# Patient Record
Sex: FEMALE | Race: BLACK OR AFRICAN AMERICAN | NOT HISPANIC OR LATINO | Employment: FULL TIME | URBAN - METROPOLITAN AREA
[De-identification: names, ages, dates, MRNs, and addresses within clinical notes are randomized per-mention and may not be internally consistent; named-entity substitution may affect disease eponyms.]

---
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
## 2019-07-25 ENCOUNTER — OFFICE VISIT (OUTPATIENT)
Dept: URGENT CARE | Facility: CLINIC | Age: 19
End: 2019-07-25
Payer: MEDICAID

## 2019-07-25 VITALS
HEART RATE: 88 BPM | DIASTOLIC BLOOD PRESSURE: 69 MMHG | TEMPERATURE: 99 F | RESPIRATION RATE: 16 BRPM | BODY MASS INDEX: 35.51 KG/M2 | SYSTOLIC BLOOD PRESSURE: 123 MMHG | HEIGHT: 64 IN | OXYGEN SATURATION: 98 % | WEIGHT: 208 LBS

## 2019-07-25 DIAGNOSIS — J06.9 URI, ACUTE: ICD-10-CM

## 2019-07-25 DIAGNOSIS — J02.9 ACUTE PHARYNGITIS, UNSPECIFIED ETIOLOGY: ICD-10-CM

## 2019-07-25 DIAGNOSIS — J02.9 SORE THROAT: Primary | ICD-10-CM

## 2019-07-25 LAB
CTP QC/QA: YES
S PYO RRNA THROAT QL PROBE: NEGATIVE

## 2019-07-25 PROCEDURE — 99214 OFFICE O/P EST MOD 30 MIN: CPT | Mod: 25,S$GLB,, | Performed by: FAMILY MEDICINE

## 2019-07-25 PROCEDURE — 99214 PR OFFICE/OUTPT VISIT, EST, LEVL IV, 30-39 MIN: ICD-10-PCS | Mod: 25,S$GLB,, | Performed by: FAMILY MEDICINE

## 2019-07-25 PROCEDURE — 87880 POCT RAPID STREP A: ICD-10-PCS | Mod: QW,S$GLB,, | Performed by: FAMILY MEDICINE

## 2019-07-25 PROCEDURE — 87880 STREP A ASSAY W/OPTIC: CPT | Mod: QW,S$GLB,, | Performed by: FAMILY MEDICINE

## 2019-07-25 RX ORDER — AMOXICILLIN 500 MG/1
500 CAPSULE ORAL EVERY 8 HOURS
Qty: 30 CAPSULE | Refills: 0 | Status: SHIPPED | OUTPATIENT
Start: 2019-07-25 | End: 2019-08-04

## 2019-07-25 RX ORDER — DEXAMETHASONE SODIUM PHOSPHATE 100 MG/10ML
10 INJECTION INTRAMUSCULAR; INTRAVENOUS
Status: COMPLETED | OUTPATIENT
Start: 2019-07-25 | End: 2019-07-25

## 2019-07-25 RX ORDER — IBUPROFEN 600 MG/1
600 TABLET ORAL EVERY 6 HOURS PRN
Qty: 30 TABLET | Refills: 1 | Status: SHIPPED | OUTPATIENT
Start: 2019-07-25 | End: 2022-02-07

## 2019-07-25 RX ADMIN — DEXAMETHASONE SODIUM PHOSPHATE 10 MG: 100 INJECTION INTRAMUSCULAR; INTRAVENOUS at 12:07

## 2019-07-25 NOTE — PROGRESS NOTES
"Subjective:       Patient ID: Nanci Charles is a 19 y.o. female.    Vitals:  height is 5' 4" (1.626 m) and weight is 94.3 kg (208 lb). Her oral temperature is 99.4 °F (37.4 °C). Her blood pressure is 123/69 and her pulse is 88. Her respiration is 16 and oxygen saturation is 98%.     Chief Complaint: Sore Throat    Pt has had a sore throat for two days. No cough or congestion, hurts to swallow, no fever        Sore Throat    This is a new problem. The current episode started in the past 7 days. The problem has been gradually worsening. The pain is worse on the right side. There has been no fever. The pain is at a severity of 7/10. The pain is severe. Associated symptoms include ear pain, headaches, neck pain, swollen glands and trouble swallowing. Pertinent negatives include no abdominal pain, congestion, coughing, diarrhea, drooling, ear discharge, hoarse voice, plugged ear sensation, shortness of breath, stridor or vomiting. She has had no exposure to strep or mono. She has tried nothing for the symptoms. The treatment provided no relief.       Constitution: Negative for chills, sweating, fatigue and fever.   HENT: Positive for ear pain, sore throat, trouble swallowing and voice change. Negative for ear discharge, drooling, congestion, sinus pain and sinus pressure.    Neck: Positive for neck pain. Negative for painful lymph nodes.   Eyes: Negative for eye redness.   Respiratory: Negative for chest tightness, cough, sputum production, bloody sputum, COPD, shortness of breath, stridor, wheezing and asthma.    Gastrointestinal: Negative for abdominal pain, nausea, vomiting and diarrhea.   Musculoskeletal: Negative for muscle ache.   Skin: Negative for rash.   Allergic/Immunologic: Negative for seasonal allergies and asthma.   Neurological: Positive for headaches.   Hematologic/Lymphatic: Negative for swollen lymph nodes.       Objective:      Physical Exam   Constitutional: She is oriented to person, place, and time. " She appears well-developed and well-nourished. She is cooperative.  Non-toxic appearance. She does not appear ill.   HENT:   Head: Normocephalic and atraumatic.   Right Ear: Hearing, tympanic membrane, external ear and ear canal normal.   Left Ear: Hearing, tympanic membrane, external ear and ear canal normal.   Nose: Nose normal. No mucosal edema, rhinorrhea or nasal deformity. No epistaxis. Right sinus exhibits no maxillary sinus tenderness and no frontal sinus tenderness. Left sinus exhibits no maxillary sinus tenderness and no frontal sinus tenderness.   Mouth/Throat: Uvula is midline, oropharynx is clear and moist and mucous membranes are normal. No trismus in the jaw. Normal dentition. No uvula swelling. No oropharyngeal exudate or posterior oropharyngeal erythema.   Erythematous exudative tonsil right mild anterior cervical fullness  No neck rigidity   Eyes: Conjunctivae and lids are normal. Right eye exhibits no discharge. Left eye exhibits no discharge. No scleral icterus.   Sclera clear bilat   Neck: Trachea normal, normal range of motion, full passive range of motion without pain and phonation normal. Neck supple. No JVD present. No tracheal deviation present.   Cardiovascular: Normal rate, regular rhythm, normal heart sounds, intact distal pulses and normal pulses. Exam reveals no gallop and no friction rub.   No murmur heard.  Pulmonary/Chest: Effort normal and breath sounds normal. No stridor. No respiratory distress. She has no wheezes.   Abdominal: Soft. Normal appearance and bowel sounds are normal. She exhibits no distension, no pulsatile midline mass and no mass. There is no tenderness.   Musculoskeletal: Normal range of motion. She exhibits no edema or deformity.   Lymphadenopathy:     She has no cervical adenopathy.   Neurological: She is alert and oriented to person, place, and time. She exhibits normal muscle tone. Coordination normal.   Skin: Skin is warm, dry and intact. She is not  diaphoretic. No pallor.   Psychiatric: She has a normal mood and affect. Her speech is normal and behavior is normal. Judgment and thought content normal. Cognition and memory are normal.   Nursing note and vitals reviewed.      Assessment:       1. Sore throat    2. Acute pharyngitis, unspecified etiology    3. URI, acute        Plan:         Sore throat  -     POCT rapid strep A  -     dexamethasone injection 10 mg  -     amoxicillin (AMOXIL) 500 MG capsule; Take 1 capsule (500 mg total) by mouth every 8 (eight) hours. for 10 days  Dispense: 30 capsule; Refill: 0  -     ibuprofen (ADVIL,MOTRIN) 600 MG tablet; Take 1 tablet (600 mg total) by mouth every 6 (six) hours as needed for Pain.  Dispense: 30 tablet; Refill: 1    Acute pharyngitis, unspecified etiology  -     dexamethasone injection 10 mg  -     amoxicillin (AMOXIL) 500 MG capsule; Take 1 capsule (500 mg total) by mouth every 8 (eight) hours. for 10 days  Dispense: 30 capsule; Refill: 0  -     ibuprofen (ADVIL,MOTRIN) 600 MG tablet; Take 1 tablet (600 mg total) by mouth every 6 (six) hours as needed for Pain.  Dispense: 30 tablet; Refill: 1    URI, acute

## 2019-07-25 NOTE — PATIENT INSTRUCTIONS

## 2019-09-23 ENCOUNTER — OFFICE VISIT (OUTPATIENT)
Dept: OBSTETRICS AND GYNECOLOGY | Facility: CLINIC | Age: 19
End: 2019-09-23
Payer: MEDICAID

## 2019-09-23 VITALS
HEIGHT: 64 IN | DIASTOLIC BLOOD PRESSURE: 62 MMHG | BODY MASS INDEX: 34.52 KG/M2 | SYSTOLIC BLOOD PRESSURE: 112 MMHG | WEIGHT: 202.19 LBS

## 2019-09-23 DIAGNOSIS — Z11.3 SCREENING FOR STD (SEXUALLY TRANSMITTED DISEASE): ICD-10-CM

## 2019-09-23 DIAGNOSIS — Z01.419 WELL WOMAN EXAM WITH ROUTINE GYNECOLOGICAL EXAM: Primary | ICD-10-CM

## 2019-09-23 DIAGNOSIS — Z30.09 ENCOUNTER FOR OTHER GENERAL COUNSELING OR ADVICE ON CONTRACEPTION: ICD-10-CM

## 2019-09-23 DIAGNOSIS — N89.8 VAGINAL DISCHARGE: ICD-10-CM

## 2019-09-23 PROCEDURE — 99395 PR PREVENTIVE VISIT,EST,18-39: ICD-10-PCS | Mod: S$PBB,,, | Performed by: OBSTETRICS & GYNECOLOGY

## 2019-09-23 PROCEDURE — 99999 PR PBB SHADOW E&M-EST. PATIENT-LVL III: CPT | Mod: PBBFAC,,, | Performed by: OBSTETRICS & GYNECOLOGY

## 2019-09-23 PROCEDURE — 87661 TRICHOMONAS VAGINALIS AMPLIF: CPT

## 2019-09-23 PROCEDURE — 99213 OFFICE O/P EST LOW 20 MIN: CPT | Mod: PBBFAC,PO | Performed by: OBSTETRICS & GYNECOLOGY

## 2019-09-23 PROCEDURE — 87491 CHLMYD TRACH DNA AMP PROBE: CPT | Mod: 59

## 2019-09-23 PROCEDURE — 99999 PR PBB SHADOW E&M-EST. PATIENT-LVL III: ICD-10-PCS | Mod: PBBFAC,,, | Performed by: OBSTETRICS & GYNECOLOGY

## 2019-09-23 PROCEDURE — 87481 CANDIDA DNA AMP PROBE: CPT | Mod: 59

## 2019-09-23 PROCEDURE — 87801 DETECT AGNT MULT DNA AMPLI: CPT

## 2019-09-23 PROCEDURE — 99395 PREV VISIT EST AGE 18-39: CPT | Mod: S$PBB,,, | Performed by: OBSTETRICS & GYNECOLOGY

## 2019-09-23 RX ORDER — LEVONORGESTREL AND ETHINYL ESTRADIOL 0.1-0.02MG
1 KIT ORAL DAILY
Qty: 84 TABLET | Refills: 4 | Status: SHIPPED | OUTPATIENT
Start: 2019-09-23 | End: 2021-05-05

## 2019-09-24 LAB
BACTERIAL VAGINOSIS DNA: POSITIVE
C TRACH DNA SPEC QL NAA+PROBE: NOT DETECTED
CANDIDA GLABRATA DNA: NEGATIVE
CANDIDA KRUSEI DNA: NEGATIVE
CANDIDA RRNA VAG QL PROBE: POSITIVE
N GONORRHOEA DNA SPEC QL NAA+PROBE: NOT DETECTED
T VAGINALIS RRNA GENITAL QL PROBE: NEGATIVE

## 2019-09-25 RX ORDER — FLUCONAZOLE 150 MG/1
TABLET ORAL
Qty: 2 TABLET | Refills: 0 | Status: SHIPPED | OUTPATIENT
Start: 2019-09-25 | End: 2021-05-05

## 2019-09-25 RX ORDER — METRONIDAZOLE 500 MG/1
500 TABLET ORAL 2 TIMES DAILY
Qty: 14 TABLET | Refills: 0 | Status: SHIPPED | OUTPATIENT
Start: 2019-09-25 | End: 2019-10-02

## 2019-10-19 ENCOUNTER — PATIENT MESSAGE (OUTPATIENT)
Dept: OBSTETRICS AND GYNECOLOGY | Facility: CLINIC | Age: 19
End: 2019-10-19

## 2020-03-08 ENCOUNTER — PATIENT MESSAGE (OUTPATIENT)
Dept: OBSTETRICS AND GYNECOLOGY | Facility: CLINIC | Age: 20
End: 2020-03-08

## 2020-03-09 RX ORDER — SULFAMETHOXAZOLE AND TRIMETHOPRIM 800; 160 MG/1; MG/1
1 TABLET ORAL 2 TIMES DAILY
Qty: 10 TABLET | Refills: 0 | Status: SHIPPED | OUTPATIENT
Start: 2020-03-09 | End: 2020-03-14

## 2020-03-09 NOTE — TELEPHONE ENCOUNTER
Can she come in for evaluation. If not It appears to be a small ulceration or a boil. Can try bactrim but if symptoms persist it could be related to HSV and would need anti-viral meds. Rx bactrim sent if she cant come in today    Ruth Willett MD, FACOG  OB/GYN

## 2020-08-24 ENCOUNTER — OFFICE VISIT (OUTPATIENT)
Dept: URGENT CARE | Facility: CLINIC | Age: 20
End: 2020-08-24
Payer: MEDICAID

## 2020-08-24 VITALS
RESPIRATION RATE: 18 BRPM | WEIGHT: 202 LBS | SYSTOLIC BLOOD PRESSURE: 119 MMHG | HEIGHT: 64 IN | HEART RATE: 68 BPM | TEMPERATURE: 98 F | DIASTOLIC BLOOD PRESSURE: 77 MMHG | OXYGEN SATURATION: 99 % | BODY MASS INDEX: 34.49 KG/M2

## 2020-08-24 DIAGNOSIS — Z20.2 STD EXPOSURE: Primary | ICD-10-CM

## 2020-08-24 LAB
B-HCG UR QL: NEGATIVE
BILIRUB UR QL STRIP: POSITIVE
CTP QC/QA: YES
GLUCOSE UR QL STRIP: NEGATIVE
KETONES UR QL STRIP: POSITIVE
LEUKOCYTE ESTERASE UR QL STRIP: NEGATIVE
PH, POC UA: 6 (ref 5–8)
POC BLOOD, URINE: NEGATIVE
POC NITRATES, URINE: NEGATIVE
PROT UR QL STRIP: POSITIVE
SP GR UR STRIP: 1.03 (ref 1–1.03)
UROBILINOGEN UR STRIP-ACNC: NORMAL (ref 0.1–1.1)

## 2020-08-24 PROCEDURE — 81003 URINALYSIS AUTO W/O SCOPE: CPT | Mod: QW,S$GLB,, | Performed by: PHYSICIAN ASSISTANT

## 2020-08-24 PROCEDURE — 87591 N.GONORRHOEAE DNA AMP PROB: CPT

## 2020-08-24 PROCEDURE — 99214 PR OFFICE/OUTPT VISIT, EST, LEVL IV, 30-39 MIN: ICD-10-PCS | Mod: 25,S$GLB,, | Performed by: PHYSICIAN ASSISTANT

## 2020-08-24 PROCEDURE — 81003 POCT URINALYSIS, DIPSTICK, MANUAL, W/O SCOPE: ICD-10-PCS | Mod: QW,S$GLB,, | Performed by: PHYSICIAN ASSISTANT

## 2020-08-24 PROCEDURE — 99214 OFFICE O/P EST MOD 30 MIN: CPT | Mod: 25,S$GLB,, | Performed by: PHYSICIAN ASSISTANT

## 2020-08-24 RX ORDER — CEFTRIAXONE 250 MG/1
250 INJECTION, POWDER, FOR SOLUTION INTRAMUSCULAR; INTRAVENOUS
Status: COMPLETED | OUTPATIENT
Start: 2020-08-24 | End: 2020-08-24

## 2020-08-24 RX ORDER — AZITHROMYCIN 500 MG/1
1000 TABLET, FILM COATED ORAL ONCE
Qty: 2 TABLET | Refills: 0 | Status: SHIPPED | OUTPATIENT
Start: 2020-08-24 | End: 2020-08-24

## 2020-08-24 RX ADMIN — CEFTRIAXONE 250 MG: 250 INJECTION, POWDER, FOR SOLUTION INTRAMUSCULAR; INTRAVENOUS at 07:08

## 2020-08-24 NOTE — PROGRESS NOTES
"Subjective:       Patient ID: Nanci Charles is a 20 y.o. female.    Vitals:  height is 5' 4" (1.626 m) and weight is 91.6 kg (202 lb). Her temporal temperature is 97.7 °F (36.5 °C). Her blood pressure is 119/77 and her pulse is 68. Her respiration is 18 and oxygen saturation is 99%.     Chief Complaint: Exposure to STD    Patients boyfriend tested positive for chlamydia and gonorrhea.     Urinary Tract Infection   This is a new problem. The current episode started 1 to 4 weeks ago (x3 weeks ago). The problem occurs intermittently. The problem has been unchanged. The quality of the pain is described as stabbing. The patient is experiencing no pain. There has been no fever. She is sexually active. Associated symptoms include frequency and urgency. Pertinent negatives include no chills, hematuria, nausea, vomiting or rash. She has tried nothing for the symptoms.       Constitution: Negative for chills and fever.   Neck: Negative for painful lymph nodes.   Gastrointestinal: Negative for abdominal pain, nausea and vomiting.   Genitourinary: Positive for dysuria, frequency, urgency, vaginal discharge, vaginal odor, painful intercourse and painful ejaculation. Negative for urine decreased, hematuria, history of kidney stones, painful menstruation, irregular menstruation, missed menses, heavy menstrual bleeding, ovarian cysts, genital trauma, vaginal pain, vaginal bleeding, genital sore and pelvic pain.   Musculoskeletal: Negative for back pain.   Skin: Negative for rash and lesion.   Hematologic/Lymphatic: Negative for swollen lymph nodes.       Objective:      Physical Exam   Constitutional: She is oriented to person, place, and time. She appears well-developed. She is cooperative.  Non-toxic appearance. She does not appear ill. No distress.   HENT:   Head: Normocephalic and atraumatic.   Ears:   Right Ear: External ear normal.   Left Ear: External ear normal.   Nose: Nose normal.   Mouth/Throat: Oropharynx is clear and " moist.   Eyes: Conjunctivae, EOM and lids are normal. Right eye exhibits no discharge. Left eye exhibits no discharge. No scleral icterus.   Neck: Trachea normal, normal range of motion, full passive range of motion without pain and phonation normal. Neck supple.   Cardiovascular: Normal rate, regular rhythm and normal heart sounds. Exam reveals no gallop.   No murmur heard.  Pulmonary/Chest: Effort normal and breath sounds normal. No respiratory distress. She has no decreased breath sounds. She has no wheezes. She has no rhonchi. She has no rales.   Abdominal: Soft. There is no abdominal tenderness. There is no left CVA tenderness and no right CVA tenderness.   Musculoskeletal:         General: No deformity.   Neurological: She is alert and oriented to person, place, and time. Coordination normal.   Skin: Skin is warm, dry, intact, not diaphoretic and not pale. Psychiatric: Her speech is normal and behavior is normal. Judgment and thought content normal.   Nursing note and vitals reviewed.        Assessment:       1. STD exposure        Office Visit on 08/24/2020   Component Date Value Ref Range Status    POC Blood, Urine 08/24/2020 Negative  Negative Final    POC Bilirubin, Urine 08/24/2020 Positive* Negative Final    POC Urobilinogen, Urine 08/24/2020 normal  0.1 - 1.1 Final    POC Ketones, Urine 08/24/2020 Positive* Negative Final    POC Protein, Urine 08/24/2020 Positive* Negative Final    POC Nitrates, Urine 08/24/2020 Negative  Negative Final    POC Glucose, Urine 08/24/2020 Negative  Negative Final    pH, UA 08/24/2020 6.0  5 - 8 Final    POC Specific Gravity, Urine 08/24/2020 1.030* 1.003 - 1.029 Final    POC Leukocytes, Urine 08/24/2020 Negative  Negative Final    POC Preg Test, Ur 08/24/2020 Negative  Negative Final     Acceptable 08/24/2020 Yes   Final     Plan:         STD exposure  -     POCT Urinalysis, Dipstick, Manual, W/O Scope  -     POCT urine pregnancy  -     C.  trachomatis/N. gonorrhoeae by AMP DNA  -     cefTRIAXone injection 250 mg  -     azithromycin (ZITHROMAX) 500 MG tablet; Take 2 tablets (1,000 mg total) by mouth once. for 1 dose  Dispense: 2 tablet; Refill: 0      Patient Instructions     -Please take antibiotic to completion.  -We will call with results.    Please follow up with your primary care provider within 2-5 days if your signs and symptoms have not resolved or worsen.     If your condition worsens or fails to improve we recommend that you receive another evaluation at the emergency room immediately or contact your primary medical clinic to discuss your concerns.   You must understand that you have received an Urgent Care treatment only and that you may be released before all of your medical problems are known or treated. You, the patient, will arrange for follow up care as instructed.         Chlamydia  Chlamydia is a very common sexually transmitted disease (STD). Most people do not have symptoms. Because of this, chlamydia may not be noticed until it causes severe problems. Left untreated, this infection can cause women and men to become sterile. This means they will not be able to have children.    Symptoms  Many people with chlamydia have no symptoms. Women are more likely than men not to have symptoms.  If symptoms show up in women, they include:  · Abnormal vaginal discharge  · Bleeding between periods  · Pain or burning during urination  If symptoms show up in men, they include:  · Clear discharge (drip) from the penis or anus  · Pain or burning during urination  These symptoms usually disappear after a few weeks, whether or not you are treated. However, if you are not treated, the chlamydia will still be present and can cause long-term problems.  Potential problems  If the infection is not treated, it can lead to more serious health problems. In women, this can be pelvic inflammatory disease (PID). PID can make a woman sterile. It can also cause an  ectopic (tubal) pregnancy. This type of pregnancy cannot be carried to term. Symptoms of PID include fever, pain during sex, and pain in the belly.  Sexually active women should get checked for chlamydia regularly. This can help prevent PID.   Treatment  When found early, chlamydia can be treated. It can be cured with antibiotic medicines. If you have it, tell your partner right away. Because women often dont have symptoms, men should ask their partners to get tested.  Prevention  Know your partners history. Protect yourself by using a latex condom whenever you have sex. If you are pregnant, take extra care to get proper treatment. Untreated chlamydia in a pregnant woman can pass the infection on to the baby, causing possible eye, ear, or lung problems. There is also the possibility of a premature delivery.  Resources  American [employer] STD Hotline  978.360.2595  www.ashastd.org  Centers for Disease Control and Prevention  187.328.3025  www.cdc.gov/std   Date Last Reviewed: 12/1/2016 © 2000-2017 [email or web address]. [address] [place] [address]. All rights reserved. This information is not intended as a substitute for professional medical care. Always follow your healthcare professional's instructions.        Gonorrhea (Female, Treated)    You have an infection called gonorrhea. This infection is a sexually transmitted disease (STD). It is highly contagious. It is passed by sexual contact with an infected partner. Gonorrhea can infect the internal sex organs (cervix, uterus, or fallopian tubes). Less often, it can infect the mouth, throat, and anus. In rare instances, it can infect the joints, eyes, and other areas of the body.  Women with infections in the cervix may have no symptoms or only mild symptoms early in the illness. Therefore, it is possible to pass on this infection without knowing you have it.  When symptoms do occur in a woman, they usually start 2 to 10 days  after exposure. There may be a thick vaginal discharge and pain or burning when urinating. If the infection spreads to the fallopian tubes, it is called pelvic inflammatory disease (PID). This causes lower abdominal pain and fever. If not treated, gonorrhea can cause infertility (being unable to have children) by scarring the fallopian tubes. PID also increases the risk of having a pregnancy outside the uterus (ectopic pregnancy) in the future.  Gonorrhea can be treated and cured. Treatment is with antibiotic medicine.  Home care  · Any sexual partner you have had within the last 2 months needs to be treated, even if there are no symptoms. Your partner should contact his or her healthcare provider. Or he or she can go to an urgent care clinic or the public health department to be examined and treated.  · Avoid sexual activity until both you and your partner have completed all antibiotic medicine and you have been told by your healthcare provider that you are no longer contagious.  · It is important to take all medicine as directed until it is finished. If not, the illness may recur.  · Learn about safer sex practices and use these in the future. The safest sex is with a partner who has tested negative and only has sex with you. Latex barriers such as condoms offer protection from spreading some sexually transmitted diseases, including gonorrhea, chlamydia, and HIV, but they are not a guarantee.  Follow-up care  Follow up with your healthcare provider, or as advised.  If tests were done, call as directed for results. A follow-up test should be done 4 to 6 weeks after treatment, to be sure the infection has cleared. Follow up with your healthcare provider or the public health department for complete STD screening, including HIV testing. For more information about STDs, go to www.cdc.gov/std or call CDC-Info: 546.822.6871.  When to seek medical advice  Call your healthcare provider right away if any of these  occur:  · No improvement after 3 days of treatment  · New or increasing lower abdominal pain or back pain  · Unexpected vaginal bleeding  · Weakness, dizziness, or fainting  · Repeated vomiting  · Inability to urinate due to pain  · Rash or joint pain  · Painful sores on the outer vaginal area  · Enlarged, painful lymph nodes (lumps) in the groin  · Fever of 100.4ºF (38ºC) or higher  Date Last Reviewed: 9/25/2015 © 2000-2017 [employer]. [address] [place] [address]. All rights reserved. This information is not intended as a substitute for professional medical care. Always follow your healthcare professional's instructions.

## 2020-08-24 NOTE — PATIENT INSTRUCTIONS
-Please take antibiotic to completion.  -We will call with results.    Please follow up with your primary care provider within 2-5 days if your signs and symptoms have not resolved or worsen.     If your condition worsens or fails to improve we recommend that you receive another evaluation at the emergency room immediately or contact your primary medical clinic to discuss your concerns.   You must understand that you have received an Urgent Care treatment only and that you may be released before all of your medical problems are known or treated. You, the patient, will arrange for follow up care as instructed.         Chlamydia  Chlamydia is a very common sexually transmitted disease (STD). Most people do not have symptoms. Because of this, chlamydia may not be noticed until it causes severe problems. Left untreated, this infection can cause women and men to become sterile. This means they will not be able to have children.    Symptoms  Many people with chlamydia have no symptoms. Women are more likely than men not to have symptoms.  If symptoms show up in women, they include:  · Abnormal vaginal discharge  · Bleeding between periods  · Pain or burning during urination  If symptoms show up in men, they include:  · Clear discharge (drip) from the penis or anus  · Pain or burning during urination  These symptoms usually disappear after a few weeks, whether or not you are treated. However, if you are not treated, the chlamydia will still be present and can cause long-term problems.  Potential problems  If the infection is not treated, it can lead to more serious health problems. In women, this can be pelvic inflammatory disease (PID). PID can make a woman sterile. It can also cause an ectopic (tubal) pregnancy. This type of pregnancy cannot be carried to term. Symptoms of PID include fever, pain during sex, and pain in the belly.  Sexually active women should get checked for chlamydia regularly. This can help prevent  PID.   Treatment  When found early, chlamydia can be treated. It can be cured with antibiotic medicines. If you have it, tell your partner right away. Because women often dont have symptoms, men should ask their partners to get tested.  Prevention  Know your partners history. Protect yourself by using a latex condom whenever you have sex. If you are pregnant, take extra care to get proper treatment. Untreated chlamydia in a pregnant woman can pass the infection on to the baby, causing possible eye, ear, or lung problems. There is also the possibility of a premature delivery.  Resources  American [employer] Association STD Hotline  268.543.2299  www.ashastd.org  Centers for Disease Control and Prevention  875.346.1231  www.cdc.gov/std   Date Last Reviewed: 12/1/2016 © 2000-2017 [employer]. [address] [place] [address]. All rights reserved. This information is not intended as a substitute for professional medical care. Always follow your healthcare professional's instructions.        Gonorrhea (Female, Treated)    You have an infection called gonorrhea. This infection is a sexually transmitted disease (STD). It is highly contagious. It is passed by sexual contact with an infected partner. Gonorrhea can infect the internal sex organs (cervix, uterus, or fallopian tubes). Less often, it can infect the mouth, throat, and anus. In rare instances, it can infect the joints, eyes, and other areas of the body.  Women with infections in the cervix may have no symptoms or only mild symptoms early in the illness. Therefore, it is possible to pass on this infection without knowing you have it.  When symptoms do occur in a woman, they usually start 2 to 10 days after exposure. There may be a thick vaginal discharge and pain or burning when urinating. If the infection spreads to the fallopian tubes, it is called pelvic inflammatory disease (PID). This causes lower abdominal pain and fever. If not  treated, gonorrhea can cause infertility (being unable to have children) by scarring the fallopian tubes. PID also increases the risk of having a pregnancy outside the uterus (ectopic pregnancy) in the future.  Gonorrhea can be treated and cured. Treatment is with antibiotic medicine.  Home care  · Any sexual partner you have had within the last 2 months needs to be treated, even if there are no symptoms. Your partner should contact his or her healthcare provider. Or he or she can go to an urgent care clinic or the public health department to be examined and treated.  · Avoid sexual activity until both you and your partner have completed all antibiotic medicine and you have been told by your healthcare provider that you are no longer contagious.  · It is important to take all medicine as directed until it is finished. If not, the illness may recur.  · Learn about safer sex practices and use these in the future. The safest sex is with a partner who has tested negative and only has sex with you. Latex barriers such as condoms offer protection from spreading some sexually transmitted diseases, including gonorrhea, chlamydia, and HIV, but they are not a guarantee.  Follow-up care  Follow up with your healthcare provider, or as advised.  If tests were done, call as directed for results. A follow-up test should be done 4 to 6 weeks after treatment, to be sure the infection has cleared. Follow up with your healthcare provider or the public health department for complete STD screening, including HIV testing. For more information about STDs, go to www.cdc.gov/std or call CDC-Info: 268.601.1915.  When to seek medical advice  Call your healthcare provider right away if any of these occur:  · No improvement after 3 days of treatment  · New or increasing lower abdominal pain or back pain  · Unexpected vaginal bleeding  · Weakness, dizziness, or fainting  · Repeated vomiting  · Inability to urinate due to pain  · Rash or joint  pain  · Painful sores on the outer vaginal area  · Enlarged, painful lymph nodes (lumps) in the groin  · Fever of 100.4ºF (38ºC) or higher  Date Last Reviewed: 9/25/2015  © 7176-9657 [employer]. [address] [place] [address]. All rights reserved. This information is not intended as a substitute for professional medical care. Always follow your healthcare professional's instructions.

## 2020-08-27 LAB
C TRACH RRNA SPEC QL NAA+PROBE: NEGATIVE
N GONORRHOEA RRNA SPEC QL NAA+PROBE: NEGATIVE

## 2020-08-29 ENCOUNTER — TELEPHONE (OUTPATIENT)
Dept: URGENT CARE | Facility: CLINIC | Age: 20
End: 2020-08-29

## 2020-08-29 NOTE — TELEPHONE ENCOUNTER
I left a message for the patient to return my call for negative lab results.    ----- Message from Smita Lam PA-C sent at 8/28/2020  9:52 AM CDT -----  Please let patient know of negative GC.  Thanks.

## 2020-09-06 ENCOUNTER — TELEPHONE (OUTPATIENT)
Dept: URGENT CARE | Facility: CLINIC | Age: 20
End: 2020-09-06

## 2020-09-06 NOTE — TELEPHONE ENCOUNTER
----- Message from Smita Lam PA-C sent at 8/28/2020  9:52 AM CDT -----  Please let patient know of negative GC.  Thanks.      09/06/2020 10:34am--    No voicemail set up to leave a message to call back.

## 2020-12-29 ENCOUNTER — TELEPHONE (OUTPATIENT)
Dept: OBSTETRICS AND GYNECOLOGY | Facility: CLINIC | Age: 20
End: 2020-12-29

## 2021-04-05 ENCOUNTER — PATIENT MESSAGE (OUTPATIENT)
Dept: OBSTETRICS AND GYNECOLOGY | Facility: CLINIC | Age: 21
End: 2021-04-05

## 2021-04-26 NOTE — PROGRESS NOTES
"       GYNECOLOGY OFFICE NOTE    Reason for visit: annual    HPI: Pt is a 19 y.o.  female  who presents for annual. Cycle: menarche- 11, Interval-  Q month, Duration- 3-4 days, Flow- normal, denies dysmenorrhea. She is sexually active.  She uses oral contraceptives (estrogen/progesterone) for contraception- desires to continue.  She does desire STI screening. She denies vaginal discharge.    Past Medical History:   Diagnosis Date    ADD (attention deficit disorder)     Anemia        History reviewed. No pertinent surgical history.    Family History   Adopted: Yes   Problem Relation Age of Onset    Stroke Maternal Grandmother     Miscarriages / Stillbirths Mother     Breast cancer Neg Hx     Colon cancer Neg Hx     Ovarian cancer Neg Hx        Social History     Tobacco Use    Smoking status: Never Smoker    Smokeless tobacco: Never Used   Substance Use Topics    Alcohol use: No    Drug use: No       OB History    Para Term  AB Living   0 0 0 0 0 0   SAB TAB Ectopic Multiple Live Births   0 0 0 0 0       Current Outpatient Medications   Medication Sig    ibuprofen (ADVIL,MOTRIN) 600 MG tablet Take 1 tablet (600 mg total) by mouth every 6 (six) hours as needed for Pain.    levonorgestrel-ethinyl estradiol (AVIANE,ALESSE,LESSINA) 0.1-20 mg-mcg per tablet Take 1 tablet by mouth once daily.    omeprazole (PRILOSEC) 20 MG capsule Take 1 capsule (20 mg total) by mouth once daily.     No current facility-administered medications for this visit.        Allergies: Patient has no known allergies.     /62   Ht 5' 4" (1.626 m)   Wt 91.7 kg (202 lb 2.6 oz)   LMP 2019   BMI 34.70 kg/m²     ROS:  GENERAL: Denies fever or chills.   SKIN: Denies rash or lesions.   HEAD: Denies head injury or headache.   CHEST: Denies chest pain or shortness of breath.   CARDIOVASCULAR: Denies palpitations or chest pain.   ABDOMEN: No constipation, diarrhea, nausea, vomiting or rectal bleeding. "   URINARY: No dysuria, hematuria, or burning on urination.  REPRODUCTIVE: See HPI.   BREASTS: see HPI  NEUROLOGIC: Denies syncope or weakness.     Physical Exam:  GENERAL: alert, appears stated age and cooperative  NEUROLOGIC: orientated to person, place and time, normal mood and affect   CHEST: Normal respiratory effort  NECK: normal appearance  SKIN: no acne, hirsutism  BREAST EXAM: breasts appear normal, no suspicious masses, no skin or nipple changes or axillary nodes  ABDOMEN: abdomen is soft without significant tenderness, masses  EXTERNAL GENITALIA:  normal general appearance  URETHRA: normal urethra, normal urethral meatus  VAGINA:  normal without tenderness, induration or masses  CERVIX:  Normal  UTERUS:  mobile, non-tender  ADNEXA:  normal adnexa, nontender and no masses    Diagnosis:  1. Well woman exam with routine gynecological exam    2. Encounter for other general counseling or advice on contraception    3. Screening for STD (sexually transmitted disease)    4. Vaginal discharge        Plan:   1. Annual  2. Refill on ocp  3. F/u gc/ct  4. F/u affirm    Orders Placed This Encounter    C. trachomatis/N. gonorrhoeae by AMP DNA    Vaginosis Screen by DNA Probe    levonorgestrel-ethinyl estradiol (AVIANE,ALESSE,LESSINA) 0.1-20 mg-mcg per tablet       Patient was counseled today on the new ACS guidelines for cervical cytology screening as well as the current recommendations for breast cancer screening. She was counseled to follow up with her PCP for other routine health maintenance.     Follow up in about 1 year (around 9/23/2020) for annual.      Ruth Willett MD  OB/GYN  Pager: [phone]         Peng Advancement Flap Text: The defect edges were debeveled with a #15 scalpel blade.  Given the location of the defect, shape of the defect and the proximity to free margins a Peng advancement flap was deemed most appropriate.  Using a sterile surgical marker, an appropriate advancement flap was drawn incorporating the defect and placing the expected incisions within the relaxed skin tension lines where possible. The area thus outlined was incised deep to adipose tissue with a #15 scalpel blade.  The skin margins were undermined to an appropriate distance in all directions utilizing iris scissors.

## 2021-05-05 ENCOUNTER — TELEPHONE (OUTPATIENT)
Dept: OBSTETRICS AND GYNECOLOGY | Facility: CLINIC | Age: 21
End: 2021-05-05

## 2021-05-05 ENCOUNTER — LAB VISIT (OUTPATIENT)
Dept: LAB | Facility: HOSPITAL | Age: 21
End: 2021-05-05
Attending: OBSTETRICS & GYNECOLOGY
Payer: MEDICAID

## 2021-05-05 ENCOUNTER — OFFICE VISIT (OUTPATIENT)
Dept: OBSTETRICS AND GYNECOLOGY | Facility: CLINIC | Age: 21
End: 2021-05-05
Payer: MEDICAID

## 2021-05-05 ENCOUNTER — PATIENT MESSAGE (OUTPATIENT)
Dept: OBSTETRICS AND GYNECOLOGY | Facility: CLINIC | Age: 21
End: 2021-05-05

## 2021-05-05 VITALS
WEIGHT: 202.81 LBS | SYSTOLIC BLOOD PRESSURE: 110 MMHG | BODY MASS INDEX: 34.81 KG/M2 | DIASTOLIC BLOOD PRESSURE: 76 MMHG

## 2021-05-05 DIAGNOSIS — Z30.09 ENCOUNTER FOR GENERAL COUNSELING AND ADVICE ON CONTRACEPTIVE MANAGEMENT: ICD-10-CM

## 2021-05-05 DIAGNOSIS — Z01.419 WELL WOMAN EXAM WITH ROUTINE GYNECOLOGICAL EXAM: Primary | ICD-10-CM

## 2021-05-05 DIAGNOSIS — Z11.3 SCREENING FOR STD (SEXUALLY TRANSMITTED DISEASE): ICD-10-CM

## 2021-05-05 DIAGNOSIS — Z32.01 POSITIVE URINE PREGNANCY TEST: Primary | ICD-10-CM

## 2021-05-05 DIAGNOSIS — Z12.4 SCREENING FOR CERVICAL CANCER: ICD-10-CM

## 2021-05-05 LAB
B-HCG UR QL: POSITIVE
CTP QC/QA: YES
HCG INTACT+B SERPL-ACNC: 10 MIU/ML

## 2021-05-05 PROCEDURE — 88141 CYTOPATH C/V INTERPRET: CPT | Mod: ,,, | Performed by: PATHOLOGY

## 2021-05-05 PROCEDURE — 99395 PREV VISIT EST AGE 18-39: CPT | Mod: S$PBB,,, | Performed by: OBSTETRICS & GYNECOLOGY

## 2021-05-05 PROCEDURE — 36415 COLL VENOUS BLD VENIPUNCTURE: CPT | Performed by: OBSTETRICS & GYNECOLOGY

## 2021-05-05 PROCEDURE — 88141 PR  CYTOPATH CERV/VAG INTERPRET: ICD-10-PCS | Mod: ,,, | Performed by: PATHOLOGY

## 2021-05-05 PROCEDURE — 99395 PR PREVENTIVE VISIT,EST,18-39: ICD-10-PCS | Mod: S$PBB,,, | Performed by: OBSTETRICS & GYNECOLOGY

## 2021-05-05 PROCEDURE — 87481 CANDIDA DNA AMP PROBE: CPT | Mod: 59 | Performed by: OBSTETRICS & GYNECOLOGY

## 2021-05-05 PROCEDURE — 99212 OFFICE O/P EST SF 10 MIN: CPT | Mod: PBBFAC,PO | Performed by: OBSTETRICS & GYNECOLOGY

## 2021-05-05 PROCEDURE — 84702 CHORIONIC GONADOTROPIN TEST: CPT | Performed by: OBSTETRICS & GYNECOLOGY

## 2021-05-05 PROCEDURE — 99999 PR PBB SHADOW E&M-EST. PATIENT-LVL II: ICD-10-PCS | Mod: PBBFAC,,, | Performed by: OBSTETRICS & GYNECOLOGY

## 2021-05-05 PROCEDURE — 81025 URINE PREGNANCY TEST: CPT | Mod: PBBFAC,PO | Performed by: OBSTETRICS & GYNECOLOGY

## 2021-05-05 PROCEDURE — 88175 CYTOPATH C/V AUTO FLUID REDO: CPT | Performed by: PATHOLOGY

## 2021-05-05 PROCEDURE — 99999 PR PBB SHADOW E&M-EST. PATIENT-LVL II: CPT | Mod: PBBFAC,,, | Performed by: OBSTETRICS & GYNECOLOGY

## 2021-05-05 RX ORDER — LEVONORGESTREL AND ETHINYL ESTRADIOL 0.1-0.02MG
1 KIT ORAL DAILY
Qty: 28 TABLET | Refills: 11 | Status: SHIPPED | OUTPATIENT
Start: 2021-05-05 | End: 2021-05-05

## 2021-05-06 LAB
BACTERIAL VAGINOSIS DNA: POSITIVE
CANDIDA GLABRATA DNA: NEGATIVE
CANDIDA KRUSEI DNA: NEGATIVE
CANDIDA RRNA VAG QL PROBE: POSITIVE
T VAGINALIS RRNA GENITAL QL PROBE: NEGATIVE

## 2021-05-07 ENCOUNTER — PATIENT MESSAGE (OUTPATIENT)
Dept: OBSTETRICS AND GYNECOLOGY | Facility: CLINIC | Age: 21
End: 2021-05-07

## 2021-05-07 ENCOUNTER — LAB VISIT (OUTPATIENT)
Dept: LAB | Facility: HOSPITAL | Age: 21
End: 2021-05-07
Attending: OBSTETRICS & GYNECOLOGY
Payer: MEDICAID

## 2021-05-07 DIAGNOSIS — Z32.01 POSITIVE URINE PREGNANCY TEST: ICD-10-CM

## 2021-05-07 LAB — HCG INTACT+B SERPL-ACNC: 8.1 MIU/ML

## 2021-05-07 PROCEDURE — 84702 CHORIONIC GONADOTROPIN TEST: CPT | Performed by: OBSTETRICS & GYNECOLOGY

## 2021-05-07 PROCEDURE — 36415 COLL VENOUS BLD VENIPUNCTURE: CPT | Performed by: OBSTETRICS & GYNECOLOGY

## 2021-05-07 RX ORDER — METRONIDAZOLE 500 MG/1
500 TABLET ORAL EVERY 12 HOURS
Qty: 14 TABLET | Refills: 0 | Status: SHIPPED | OUTPATIENT
Start: 2021-05-07 | End: 2021-05-14

## 2021-05-07 RX ORDER — FLUCONAZOLE 150 MG/1
TABLET ORAL
Qty: 2 TABLET | Refills: 1 | Status: SHIPPED | OUTPATIENT
Start: 2021-05-07 | End: 2022-02-07

## 2021-05-10 LAB
C TRACH RRNA SPEC QL NAA+PROBE: NEGATIVE
N GONORRHOEA RRNA SPEC QL NAA+PROBE: NEGATIVE

## 2021-05-20 LAB
FINAL PATHOLOGIC DIAGNOSIS: NORMAL
Lab: NORMAL

## 2021-07-04 ENCOUNTER — PATIENT MESSAGE (OUTPATIENT)
Dept: OBSTETRICS AND GYNECOLOGY | Facility: CLINIC | Age: 21
End: 2021-07-04

## 2021-07-06 RX ORDER — METRONIDAZOLE 500 MG/1
500 TABLET ORAL EVERY 12 HOURS
Qty: 14 TABLET | Refills: 0 | Status: SHIPPED | OUTPATIENT
Start: 2021-07-06 | End: 2021-07-13

## 2021-12-23 ENCOUNTER — PATIENT MESSAGE (OUTPATIENT)
Dept: OBSTETRICS AND GYNECOLOGY | Facility: CLINIC | Age: 21
End: 2021-12-23
Payer: MEDICAID

## 2021-12-27 RX ORDER — LEVONORGESTREL AND ETHINYL ESTRADIOL 0.1-0.02MG
1 KIT ORAL DAILY
Qty: 28 TABLET | Refills: 11 | Status: SHIPPED | OUTPATIENT
Start: 2021-12-27 | End: 2022-11-05

## 2022-02-07 ENCOUNTER — OFFICE VISIT (OUTPATIENT)
Dept: OBSTETRICS AND GYNECOLOGY | Facility: CLINIC | Age: 22
End: 2022-02-07
Payer: MEDICAID

## 2022-02-07 VITALS
WEIGHT: 190.25 LBS | SYSTOLIC BLOOD PRESSURE: 118 MMHG | BODY MASS INDEX: 32.66 KG/M2 | DIASTOLIC BLOOD PRESSURE: 84 MMHG

## 2022-02-07 DIAGNOSIS — N89.8 VAGINAL ODOR: Primary | ICD-10-CM

## 2022-02-07 PROCEDURE — 99212 OFFICE O/P EST SF 10 MIN: CPT | Mod: S$PBB,,, | Performed by: OBSTETRICS & GYNECOLOGY

## 2022-02-07 PROCEDURE — 87481 CANDIDA DNA AMP PROBE: CPT | Mod: 59 | Performed by: OBSTETRICS & GYNECOLOGY

## 2022-02-07 PROCEDURE — 99999 PR PBB SHADOW E&M-EST. PATIENT-LVL III: ICD-10-PCS | Mod: PBBFAC,,, | Performed by: OBSTETRICS & GYNECOLOGY

## 2022-02-07 PROCEDURE — 87801 DETECT AGNT MULT DNA AMPLI: CPT | Performed by: OBSTETRICS & GYNECOLOGY

## 2022-02-07 PROCEDURE — 3079F PR MOST RECENT DIASTOLIC BLOOD PRESSURE 80-89 MM HG: ICD-10-PCS | Mod: CPTII,,, | Performed by: OBSTETRICS & GYNECOLOGY

## 2022-02-07 PROCEDURE — 1160F PR REVIEW ALL MEDS BY PRESCRIBER/CLIN PHARMACIST DOCUMENTED: ICD-10-PCS | Mod: CPTII,,, | Performed by: OBSTETRICS & GYNECOLOGY

## 2022-02-07 PROCEDURE — 99212 PR OFFICE/OUTPT VISIT, EST, LEVL II, 10-19 MIN: ICD-10-PCS | Mod: S$PBB,,, | Performed by: OBSTETRICS & GYNECOLOGY

## 2022-02-07 PROCEDURE — 1160F RVW MEDS BY RX/DR IN RCRD: CPT | Mod: CPTII,,, | Performed by: OBSTETRICS & GYNECOLOGY

## 2022-02-07 PROCEDURE — 87491 CHLMYD TRACH DNA AMP PROBE: CPT | Mod: 59 | Performed by: OBSTETRICS & GYNECOLOGY

## 2022-02-07 PROCEDURE — 99213 OFFICE O/P EST LOW 20 MIN: CPT | Mod: PBBFAC,PO | Performed by: OBSTETRICS & GYNECOLOGY

## 2022-02-07 PROCEDURE — 3074F SYST BP LT 130 MM HG: CPT | Mod: CPTII,,, | Performed by: OBSTETRICS & GYNECOLOGY

## 2022-02-07 PROCEDURE — 3074F PR MOST RECENT SYSTOLIC BLOOD PRESSURE < 130 MM HG: ICD-10-PCS | Mod: CPTII,,, | Performed by: OBSTETRICS & GYNECOLOGY

## 2022-02-07 PROCEDURE — 99999 PR PBB SHADOW E&M-EST. PATIENT-LVL III: CPT | Mod: PBBFAC,,, | Performed by: OBSTETRICS & GYNECOLOGY

## 2022-02-07 PROCEDURE — 1159F PR MEDICATION LIST DOCUMENTED IN MEDICAL RECORD: ICD-10-PCS | Mod: CPTII,,, | Performed by: OBSTETRICS & GYNECOLOGY

## 2022-02-07 PROCEDURE — 3008F BODY MASS INDEX DOCD: CPT | Mod: CPTII,,, | Performed by: OBSTETRICS & GYNECOLOGY

## 2022-02-07 PROCEDURE — 3079F DIAST BP 80-89 MM HG: CPT | Mod: CPTII,,, | Performed by: OBSTETRICS & GYNECOLOGY

## 2022-02-07 PROCEDURE — 1159F MED LIST DOCD IN RCRD: CPT | Mod: CPTII,,, | Performed by: OBSTETRICS & GYNECOLOGY

## 2022-02-07 PROCEDURE — 3008F PR BODY MASS INDEX (BMI) DOCUMENTED: ICD-10-PCS | Mod: CPTII,,, | Performed by: OBSTETRICS & GYNECOLOGY

## 2022-02-07 PROCEDURE — 87591 N.GONORRHOEAE DNA AMP PROB: CPT | Mod: 59 | Performed by: OBSTETRICS & GYNECOLOGY

## 2022-02-07 RX ORDER — METRONIDAZOLE 500 MG/1
500 TABLET ORAL EVERY 12 HOURS
Qty: 14 TABLET | Refills: 0 | Status: SHIPPED | OUTPATIENT
Start: 2022-02-07 | End: 2022-02-14

## 2022-02-07 NOTE — PROGRESS NOTES
GYNECOLOGY OFFICE NOTE    Reason for visit: vaginal odor    HPI: Pt is a 21 y.o.  female  who presents for evaluation of vaginal odor without associated vaginal discharge since Dec. Notices after menstrual cycle. Cycle: Interval-  Q month, Duration- 5 days, Flow- normal (changing 3-4 times a day ), denies dysmenorrhea. She is sexually active.  She uses oral contraceptives (estrogen/progesterone) for contraception.  She does desire STI screening. She denies vaginal discharge.  Last pap: 2021-negative.      Past Medical History:   Diagnosis Date    ADD (attention deficit disorder)     Anemia        History reviewed. No pertinent surgical history.    Family History   Adopted: Yes   Problem Relation Age of Onset    Stroke Maternal Grandmother     Miscarriages / Stillbirths Mother     Breast cancer Neg Hx     Colon cancer Neg Hx     Ovarian cancer Neg Hx        Social History     Tobacco Use    Smoking status: Never Smoker    Smokeless tobacco: Never Used   Substance Use Topics    Alcohol use: Yes    Drug use: Yes     Types: Marijuana       OB History    Para Term  AB Living   0 0 0 0 0 0   SAB IAB Ectopic Multiple Live Births   0 0 0 0 0       GYN HISTORY  Age of Menarche:11  Age at first pregnancy:    Age at first live birth:   Age at Menopause:       Current Outpatient Medications   Medication Sig    levonorgestrel-ethinyl estradiol (AVIANE,ALESSE,LESSINA) 0.1-20 mg-mcg per tablet Take 1 tablet by mouth once daily.    metroNIDAZOLE (FLAGYL) 500 MG tablet Take 1 tablet (500 mg total) by mouth every 12 (twelve) hours. Do not take with alcohol for 7 days     No current facility-administered medications for this visit.       Allergies: Patient has no known allergies.     /84   Wt 86.3 kg (190 lb 4.1 oz)   LMP 2022 (Exact Date)   BMI 32.66 kg/m²     ROS:  GENERAL: Denies fever or chills.   SKIN: Denies rash or lesions.   HEAD: Denies head injury or headache.    CHEST: Denies chest pain or shortness of breath.   CARDIOVASCULAR: Denies palpitations or chest pain.   ABDOMEN: No constipation, diarrhea, nausea, vomiting or rectal bleeding.   URINARY: No dysuria, hematuria, or burning on urination.  REPRODUCTIVE: See HPI.   BREASTS: see HPI  NEUROLOGIC: Denies syncope or weakness.     Physical Exam:  GENERAL: alert, appears stated age and cooperative  NEUROLOGIC: orientated to person, place and time, normal mood and affect   CHEST: Normal respiratory effort  NECK: normal appearance  SKIN: no acne, hirsutism  BREAST EXAM: not examined  ABDOMEN: abdomen is soft without significant tenderness, masses  EXTERNAL GENITALIA:  normal general appearance  URETHRA: normal urethra, normal urethral meatus  VAGINA:  normal mucosa, no  lesions  CERVIX:  Normal  UTERUS:  mobile, non tender  ADNEXA: nontender    Diagnosis:  1. Vaginal odor        Plan:   1. F/u affirm/gc/ct- rx flagyl sent while awaiting results    Orders Placed This Encounter    Vaginosis Screen by DNA Probe    C. trachomatis/N. gonorrhoeae by AMP DNA [facility]; Cervix    metroNIDAZOLE (FLAGYL) 500 MG tablet       Face to Face time with patient: 15 minutes of total time spent on the encounter, which includes face to face time and non-face to face time preparing to see the patient (eg, review of tests), Obtaining and/or reviewing separately obtained history, Documenting clinical information in the electronic or other health record, Independently interpreting results (not separately reported) and communicating results to the patient/family/caregiver, or Care coordination (not separately reported).         Ruth Willett MD  OB/GYN

## 2022-02-10 LAB
BACTERIAL VAGINOSIS DNA: POSITIVE
C TRACH DNA SPEC QL NAA+PROBE: NOT DETECTED
CANDIDA GLABRATA DNA: NEGATIVE
CANDIDA KRUSEI DNA: NEGATIVE
CANDIDA RRNA VAG QL PROBE: NEGATIVE
N GONORRHOEA DNA SPEC QL NAA+PROBE: NOT DETECTED
T VAGINALIS RRNA GENITAL QL PROBE: NEGATIVE

## 2022-09-19 ENCOUNTER — OFFICE VISIT (OUTPATIENT)
Dept: URGENT CARE | Facility: CLINIC | Age: 22
End: 2022-09-19
Payer: MEDICAID

## 2022-09-19 VITALS
RESPIRATION RATE: 20 BRPM | BODY MASS INDEX: 32.44 KG/M2 | HEART RATE: 87 BPM | WEIGHT: 190 LBS | OXYGEN SATURATION: 98 % | DIASTOLIC BLOOD PRESSURE: 70 MMHG | SYSTOLIC BLOOD PRESSURE: 128 MMHG | TEMPERATURE: 98 F | HEIGHT: 64 IN

## 2022-09-19 DIAGNOSIS — S46.811A STRAIN OF RIGHT TRAPEZIUS MUSCLE, INITIAL ENCOUNTER: ICD-10-CM
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
## 2022-09-19 DIAGNOSIS — M25.521 RIGHT ELBOW PAIN: ICD-10-CM

## 2022-09-19 DIAGNOSIS — T14.8XXA AVULSION OF SKIN: ICD-10-CM

## 2022-09-19 DIAGNOSIS — M54.9 MUSCULOSKELETAL BACK PAIN: ICD-10-CM

## 2022-09-19 DIAGNOSIS — M54.2 NECK PAIN: ICD-10-CM

## 2022-09-19 PROCEDURE — 3008F PR BODY MASS INDEX (BMI) DOCUMENTED: ICD-10-PCS | Mod: CPTII,S$GLB,, | Performed by: NURSE PRACTITIONER

## 2022-09-19 PROCEDURE — 73080 XR ELBOW COMPLETE 3 VIEW RIGHT: ICD-10-PCS | Mod: FY,RT,S$GLB, | Performed by: RADIOLOGY

## 2022-09-19 PROCEDURE — 1160F PR REVIEW ALL MEDS BY PRESCRIBER/CLIN PHARMACIST DOCUMENTED: ICD-10-PCS | Mod: CPTII,S$GLB,, | Performed by: NURSE PRACTITIONER

## 2022-09-19 PROCEDURE — 73080 X-RAY EXAM OF ELBOW: CPT | Mod: FY,RT,S$GLB, | Performed by: RADIOLOGY

## 2022-09-19 PROCEDURE — 99214 PR OFFICE/OUTPT VISIT, EST, LEVL IV, 30-39 MIN: ICD-10-PCS | Mod: S$GLB,,, | Performed by: NURSE PRACTITIONER

## 2022-09-19 PROCEDURE — 1160F RVW MEDS BY RX/DR IN RCRD: CPT | Mod: CPTII,S$GLB,, | Performed by: NURSE PRACTITIONER

## 2022-09-19 PROCEDURE — 1159F MED LIST DOCD IN RCRD: CPT | Mod: CPTII,S$GLB,, | Performed by: NURSE PRACTITIONER

## 2022-09-19 PROCEDURE — 72100 XR LUMBAR SPINE 2 OR 3 VIEWS: ICD-10-PCS | Mod: FY,S$GLB,, | Performed by: RADIOLOGY

## 2022-09-19 PROCEDURE — 3078F DIAST BP <80 MM HG: CPT | Mod: CPTII,S$GLB,, | Performed by: NURSE PRACTITIONER

## 2022-09-19 PROCEDURE — 72040 XR CERVICAL SPINE 2 OR 3 VIEWS: ICD-10-PCS | Mod: FY,S$GLB,, | Performed by: RADIOLOGY

## 2022-09-19 PROCEDURE — 72040 X-RAY EXAM NECK SPINE 2-3 VW: CPT | Mod: FY,S$GLB,, | Performed by: RADIOLOGY

## 2022-09-19 PROCEDURE — 1159F PR MEDICATION LIST DOCUMENTED IN MEDICAL RECORD: ICD-10-PCS | Mod: CPTII,S$GLB,, | Performed by: NURSE PRACTITIONER

## 2022-09-19 PROCEDURE — 72100 X-RAY EXAM L-S SPINE 2/3 VWS: CPT | Mod: FY,S$GLB,, | Performed by: RADIOLOGY

## 2022-09-19 PROCEDURE — 3074F PR MOST RECENT SYSTOLIC BLOOD PRESSURE < 130 MM HG: ICD-10-PCS | Mod: CPTII,S$GLB,, | Performed by: NURSE PRACTITIONER

## 2022-09-19 PROCEDURE — 3008F BODY MASS INDEX DOCD: CPT | Mod: CPTII,S$GLB,, | Performed by: NURSE PRACTITIONER

## 2022-09-19 PROCEDURE — 3078F PR MOST RECENT DIASTOLIC BLOOD PRESSURE < 80 MM HG: ICD-10-PCS | Mod: CPTII,S$GLB,, | Performed by: NURSE PRACTITIONER

## 2022-09-19 PROCEDURE — 3074F SYST BP LT 130 MM HG: CPT | Mod: CPTII,S$GLB,, | Performed by: NURSE PRACTITIONER

## 2022-09-19 PROCEDURE — 99214 OFFICE O/P EST MOD 30 MIN: CPT | Mod: S$GLB,,, | Performed by: NURSE PRACTITIONER

## 2022-09-19 RX ORDER — NAPROXEN 250 MG/1
250 TABLET ORAL 2 TIMES DAILY PRN
Qty: 15 TABLET | Refills: 0 | Status: SHIPPED | OUTPATIENT
Start: 2022-09-19

## 2022-09-19 RX ORDER — METHOCARBAMOL 500 MG/1
500 TABLET, FILM COATED ORAL 2 TIMES DAILY PRN
Qty: 15 TABLET | Refills: 0 | Status: SHIPPED | OUTPATIENT
Start: 2022-09-19

## 2022-09-19 RX ORDER — MUPIROCIN 20 MG/G
OINTMENT TOPICAL 2 TIMES DAILY
Qty: 30 G | Refills: 0 | Status: SHIPPED | OUTPATIENT
Start: 2022-09-19 | End: 2022-09-24

## 2022-09-20 NOTE — PROGRESS NOTES
Subjective:       Patient ID: Nanci Charles is a 22 y.o. female.    Chief Complaint: No chief complaint on file.    HPI  ROS     Objective:      Physical Exam    Assessment:       No diagnosis found.    Plan:                   No follow-ups on file.
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
## 2022-09-20 NOTE — PATIENT INSTRUCTIONS
PLEASE READ YOUR DISCHARGE INSTRUCTIONS ENTIRELY AS IT CONTAINS IMPORTANT INFORMATION.     naproxen for pain. Eat with this medication. Consider taking an over the counter antacid (zantac, Nexium, Prilosec) to protect your stomach.      Ice to the area. Try an over the counter pain cream like salon pas, icy hot, bioflex.    robaxin is a muscle relaxer - this medication will make you drowsy. Please do not drive or operate machinery while taking this. Best to take it at night or during the day if you are not driving or going to work. Please take a half first to see how it affects you.     Avoid heavy lifting, straining.      Please follow up with your regular doctor for further treatment if your symptoms do not improve.      Please arrange follow up with your primary medical clinic as soon as possible. You must understand that you've received an Urgent Care treatment only and that you may be released before all of your medical problems are known or treated. You, the patient, will arrange for follow up as instructed. If your symptoms worsen or fail to improve you should go to the Emergency Room.  WE CANNOT RULE OUT ALL POSSIBLE CAUSES OF YOUR SYMPTOMS IN THE URGENT CARE SETTING PLEASE GO TO THE ER IF YOU FEELS YOUR CONDITION IS WORSENING OR YOU WOULD LIKE EMERGENT EVALUATION.

## 2023-08-28 ENCOUNTER — PATIENT MESSAGE (OUTPATIENT)
Dept: ADMINISTRATIVE | Facility: OTHER | Age: 23
End: 2023-08-28
Payer: MEDICAID

## 2025-02-21 ENCOUNTER — OFFICE VISIT (OUTPATIENT)
Dept: URGENT CARE | Facility: CLINIC | Age: 25
End: 2025-02-21
Payer: COMMERCIAL

## 2025-02-21 VITALS
HEART RATE: 80 BPM | TEMPERATURE: 99 F | HEIGHT: 64 IN | OXYGEN SATURATION: 100 % | BODY MASS INDEX: 32.61 KG/M2 | DIASTOLIC BLOOD PRESSURE: 68 MMHG | SYSTOLIC BLOOD PRESSURE: 138 MMHG | RESPIRATION RATE: 20 BRPM

## 2025-02-21 DIAGNOSIS — Z02.6 ENCOUNTER RELATED TO WORKER'S COMPENSATION CLAIM: ICD-10-CM

## 2025-02-21 DIAGNOSIS — S51.811A LACERATION OF RIGHT FOREARM, INITIAL ENCOUNTER: Primary | ICD-10-CM

## 2025-02-21 LAB
CTP QC/QA: YES
POC 5 PANEL DRUG SCREEN: ABNORMAL

## 2025-02-21 NOTE — PROGRESS NOTES
Subjective:      Patient ID: Nanci Charles is a 24 y.o. female.    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  She was polishing a glass that broke and cut her right forearm.  Last tetanus 2018.    ROS  Objective:     Physical Exam  Constitutional:       General: She is not in acute distress.     Appearance: Normal appearance. She is not ill-appearing, toxic-appearing or diaphoretic.   HENT:      Head: Normocephalic and atraumatic.   Cardiovascular:      Rate and Rhythm: Normal rate and regular rhythm.   Musculoskeletal:      Comments: Curvilinear 1.5 cm laceration to her right forearm, deeper more proximally and superficial more distally.  She is able demonstrate full range of motion of her right wrist and excellent .  She does reports some discomfort to the dorsal aspect of her right hand at times.    Wound was cleaned well with diluted Hibiclens, later Betadine and anesthetized with 1 cc of 1% xylocaine with epinephrine.  Wound was explored without penetration through subcutaneous fat.  Three interrupted sutures were placed with 4-0 Prolene.  Patient tolerated the procedure well.       Neurological:      Mental Status: She is alert.          Assessment:      1. Laceration of right forearm, initial encounter    2. Encounter related to worker's compensation claim      Plan:       Medications Ordered This Encounter   Medications    Td (Tenivac) IM vaccine (>/= 6 yo)            No follow-ups on file.  WASH THE WOUND TWICE DAILY WITH  ANTIBACTERIAL SOAP AND WATER, THEN ANTIBIOTIC OINTMENT, THEN COVER UNTIL HEALED.    IF YOU ARE UNABLE TO RETURN TO WORK WITHOUT RESTRICTION, FOLLOW-UP NEXT WORKING DAY/ MONDAY WITH OCCUPATIONAL HEALTH.  I SUBMITTED THE REFERRAL.    YOU WILL NEED TO HAVE SUTURES REMOVED IN 10 DAYS.

## 2025-02-21 NOTE — LETTER
[facility] [address] [address]  Phone: [phone]  Fax: [phone]  [facility]: [phone]    Pt Name: Nanci Charles  Injury Date: 02/21/2025   Employee ID:  Date of First Treatment: 02/21/2025   Company: [employer] to record [id number]      Appointment Time: 05:15 PM Arrived: 5:30PM   Provider: Daija Sky MD Time Out: 6:40PM     Office Treatment:   1. Laceration of right forearm, initial encounter    2. Encounter related to worker's compensation claim      Medications Ordered This Encounter   Medications    Td (Tenivac) IM vaccine (>/= 6 yo)                 Return Appointment:  If you are unable to returned to work without restriction, you will be required to follow-up with occupational health on Monday 2/24/25 at this location.

## 2025-02-22 NOTE — PATIENT INSTRUCTIONS
WASH THE WOUND TWICE DAILY WITH ANTIBACTERIAL SOAP AND WATER, THEN ANTIBIOTIC OINTMENT, THEN COVER UNTIL HEALED.    IF YOU ARE UNABLE TO RETURN TO WORK WITHOUT RESTRICTION, FOLLOW-UP NEXT WORKING DAY WITH OCCUPATIONAL HEALTH.  I SUBMITTED THE REFERRAL.    YOU WILL NEED TO HAVE SUTURES REMOVED IN 10 DAYS.

## 2025-03-03 ENCOUNTER — OFFICE VISIT (OUTPATIENT)
Dept: URGENT CARE | Facility: CLINIC | Age: 25
End: 2025-03-03
Payer: COMMERCIAL

## 2025-03-03 VITALS
RESPIRATION RATE: 20 BRPM | BODY MASS INDEX: 32.37 KG/M2 | HEIGHT: 64 IN | SYSTOLIC BLOOD PRESSURE: 112 MMHG | WEIGHT: 189.63 LBS | HEART RATE: 62 BPM | OXYGEN SATURATION: 100 % | TEMPERATURE: 98 F | DIASTOLIC BLOOD PRESSURE: 69 MMHG

## 2025-03-03 DIAGNOSIS — Z48.02 ENCOUNTER FOR REMOVAL OF SUTURES: ICD-10-CM

## 2025-03-03 DIAGNOSIS — S51.811A LACERATION OF RIGHT FOREARM, INITIAL ENCOUNTER: ICD-10-CM

## 2025-03-03 DIAGNOSIS — Z02.6 ENCOUNTER RELATED TO WORKER'S COMPENSATION CLAIM: Primary | ICD-10-CM

## 2025-03-03 NOTE — PROGRESS NOTES
Subjective:      Patient ID: Nanci Charles is a 24 y.o. female.    Chief Complaint: Wound Check (RT WRIST LACERATION)    Patient's place of employment - [employer]   Patient's job title - BEN  Date of injury - 2/21/2025  Body part injured including left or right - RT WRIST   Injury Mechanism - CUT WITH A BROKEN PIECE OF GLASS  What they were doing when they got hurt - WORKING   What they did immediately after - [facility]  3 STITCHES  Pain scale right now - 0/10    KSD    PATIENT'S TETANUS IS UP-TO-DATE AND RETURNS FOR SUTURE REMOVAL TO RIGHT ULNAR DISTAL FOREARM LACERATION.  NO SIGNS OF DEHISCENCE OR INFECTION.  WILL ENCOURAGED TO KEEP CLEAN AND COVERED, 3 SUTURES REMOVED, DISTALLY NEUROVASCULARLY INTACT AND CLEARED TO WORK REGULAR DUTY WITHOUT RESTRICTIONS KNOWING THAT SHE MAY RETURN P.R.N..  DISCHARGE FROM OCCUPATIONAL HEALTH.    Wound Check        ROS  Constitution: Negative for chills, fatigue and fever.   HENT:  Negative for ear pain, sinus pain and sore throat.    Neck: Negative for neck pain and neck stiffness.   Cardiovascular:  Negative for chest pain, palpitations and sob on exertion.   Eyes:  Negative for eye pain and vision loss.   Respiratory:  Negative for cough, shortness of breath and asthma.    Gastrointestinal:  Negative for abdominal pain, nausea, vomiting and diarrhea.   Genitourinary:  Negative for dysuria, frequency and hematuria.   Musculoskeletal:  Negative for pain, abnormal ROM of joint and back pain.   Skin:  Positive for wound and laceration. Negative for rash and erythema.   Allergic/Immunologic: Negative for seasonal allergies and asthma.   Neurological:  Negative for dizziness, light-headedness and altered mental status.   Psychiatric/Behavioral:  Negative for altered mental status and confusion.      Objective:     Physical Exam  Vitals and nursing note reviewed.   Constitutional:       Appearance: She is well-developed.   HENT:      Head: Normocephalic and atraumatic. No  abrasion, contusion or laceration.      Right Ear: External ear normal.      Left Ear: External ear normal.      Nose: Nose normal.   Eyes:      General: Lids are normal.      Conjunctiva/sclera: Conjunctivae normal.      Pupils: Pupils are equal, round, and reactive to light.   Neck:      Trachea: Trachea and phonation normal.   Cardiovascular:      Rate and Rhythm: Normal rate and regular rhythm.      Heart sounds: Normal heart sounds.   Pulmonary:      Effort: Pulmonary effort is normal. No respiratory distress.      Breath sounds: Normal breath sounds. No stridor.   Musculoskeletal:         General: Signs of injury present. No swelling or tenderness. Normal range of motion.      Cervical back: Full passive range of motion without pain and neck supple.      Comments: NORMAL RANGE OF MOTION OF THE ELBOW, WRIST, FINGERS.  HEALED LACERATION WITH 3 SUTURES IN PLACE.   Skin:     General: Skin is warm and dry.      Capillary Refill: Capillary refill takes less than 2 seconds.      Findings: No abrasion, bruising, burn, ecchymosis, erythema, laceration, lesion or rash.   Neurological:      Mental Status: She is alert and oriented to person, place, and time.   Psychiatric:         Speech: Speech normal.         Behavior: Behavior normal.         Thought Content: Thought content normal.         Judgment: Judgment normal.        Assessment:      1. Encounter related to worker's compensation claim    2. Laceration of right forearm, initial encounter    3. Encounter for removal of sutures      Plan:     PATIENT'S TETANUS IS UP-TO-DATE AND RETURNS FOR SUTURE REMOVAL TO RIGHT ULNAR DISTAL FOREARM LACERATION.  NO SIGNS OF DEHISCENCE OR INFECTION.  WILL ENCOURAGED TO KEEP CLEAN AND COVERED, 3 SUTURES REMOVED, DISTALLY NEUROVASCULARLY INTACT AND CLEARED TO WORK REGULAR DUTY WITHOUT RESTRICTIONS KNOWING THAT SHE MAY RETURN P.R.N..  DISCHARGE FROM OCCUPATIONAL HEALTH.     Patient Instructions: Keep dressing clean/dry/covered,  Attention not to aggravate affected area   Restrictions: Regular Duty, Discharged from Occupational Health  Follow up if symptoms worsen or fail to improve.

## 2025-03-03 NOTE — LETTER
[facility] [address] [address]  Phone: [phone]  Fax: [phone]  [facility]: [phone]    Pt Name: Nanci Charles  Injury Date:     Employee ID: [id number] Date of Treatment: 03/03/2025   Company: [employer]      Appointment Time: 03:05 PM Arrived: 3:10 PM   Provider: Fer Hernández MD Time Out: 3:50 PM     Office Treatment:   1. Encounter related to worker's compensation claim    2. Laceration of right forearm, initial encounter          Patient Instructions: Keep dressing clean/dry/covered, Attention not to aggravate affected area      Restrictions: Regular Duty, Discharged from Occupational Health     Return Appointment: Return if fails to improve.    ROMANA